# Patient Record
Sex: MALE | Race: WHITE | URBAN - METROPOLITAN AREA
[De-identification: names, ages, dates, MRNs, and addresses within clinical notes are randomized per-mention and may not be internally consistent; named-entity substitution may affect disease eponyms.]

---

## 2017-05-12 ENCOUNTER — OFFICE VISIT (OUTPATIENT)
Dept: CARDIOLOGY CLINIC | Age: 32
End: 2017-05-12

## 2017-05-12 VITALS
WEIGHT: 186 LBS | DIASTOLIC BLOOD PRESSURE: 80 MMHG | HEIGHT: 71 IN | SYSTOLIC BLOOD PRESSURE: 126 MMHG | HEART RATE: 58 BPM | RESPIRATION RATE: 18 BRPM | OXYGEN SATURATION: 99 % | BODY MASS INDEX: 26.04 KG/M2

## 2017-05-12 DIAGNOSIS — R00.2 PALPITATIONS: Primary | ICD-10-CM

## 2017-05-12 RX ORDER — ZINC GLUCONATE 10 MG
250 LOZENGE ORAL
COMMUNITY

## 2017-05-12 NOTE — MR AVS SNAPSHOT
Visit Information Date & Time Provider Department Dept. Phone Encounter #  
 5/12/2017  3:20 PM Luzmaria Valadez MD [facility] [phone] [id number] Upcoming Health Maintenance Date Due DTaP/Tdap/Td series (1 - Tdap) 5/25/2007 INFLUENZA AGE 9 TO ADULT 8/1/2017 Allergies as of 5/12/2017  Review Complete On: 5/12/2017 By: Mike Mancilla LPN No Known Allergies Current Immunizations  Never Reviewed No immunizations on file. Not reviewed this visit You Were Diagnosed With   
  
 Codes Comments Palpitations    -  Primary ICD-10-CM: R00.2 ICD-9-CM: 785.1 Vitals BP Pulse Resp Height(growth percentile) Weight(growth percentile) SpO2  
 126/80 (!) 58 18 5' 11\" (1.803 m) 186 lb (84.4 kg) 99% BMI Smoking Status 25.94 kg/m2 Smoker, Current Status Unknown Vitals History BMI and BSA Data Body Mass Index Body Surface Area  
 25.94 kg/m 2 2.06 m 2 Preferred Pharmacy Pharmacy Name Phone [facility], [address] [facility] [phone] Your Updated Medication List  
  
   
This list is accurate as of: 5/12/17  4:08 PM.  Always use your most recent med list.  
  
  
  
  
 dilTIAZem 120 mg tablet Commonly known as:  CARDIZEM Take 1 Tab by mouth daily. FLUoxetine 10 mg capsule Commonly known as:  PROzac Take 20 mg by mouth daily. magnesium 250 mg Tab Take 250 mg by mouth.  
  
 metoprolol tartrate 25 mg tablet Commonly known as:  LOPRESSOR Take 25 mg by mouth daily. We Performed the Following AMB POC EKG ROUTINE W/ 12 LEADS, INTER & REP [78834 CPT(R)] Introducing [facility] & HEALTH SERVICES! Bello Sena introduces [employer] patient portal. Now you can access parts of your medical record, email your doctor's office, and request medication refills online.    
 
1. In your internet browser, go to [email or web address] 2. Click on the First Time User? Click Here link in the Sign In box. You will see the New Member Sign Up page. 3. Enter your [employer] Access Code exactly as it appears below. You will not need to use this code after youve completed the sign-up process. If you do not sign up before the expiration date, you must request a new code. · [employer] Access Code: [email or web address] Expires: 8/10/2017  3:14 PM 
 
4. Enter the last four digits of your Social Security Number (xxxx) and Date of Birth (mm/dd/yyyy) as indicated and click Submit. You will be taken to the next sign-up page. 5. Create a [employer] ID. This will be your [employer] login ID and cannot be changed, so think of one that is secure and easy to remember. 6. Create a [employer] password. You can change your password at any time. 7. Enter your Password Reset Question and Answer. This can be used at a later time if you forget your password. 8. Enter your e-mail address. You will receive e-mail notification when new information is available in [address] 9. Click Sign Up. You can now view and download portions of your medical record. 10. Click the Download Summary menu link to download a portable copy of your medical information. If you have questions, please visit the Frequently Asked Questions section of the [employer] website. Remember, [employer] is NOT to be used for urgent needs. For medical emergencies, dial 911. Now available from your iPhone and Android! Please provide this summary of care documentation to your next provider. Your primary care clinician is listed as Ildefonso Ziegler. If you have any questions after today's visit, please call [phone].

## 2017-05-31 NOTE — PROGRESS NOTES
PATIENT NAME: Berry Mishra         32 y.o.      1985              DATE:5/12/2017    REASON FOR VISIT: Wide complex tachycardia    HISTORY OF PRESENT ILLNESS: She had a wide complex tachycardia. Was seen by electrophysiology. Cardiac MRI was performed. This demonstrated a structurally normal heart. It was felt that he was at a very low risk for sudden cardiac death. Ambulatory monitoring showed infrequent PVCs. The patient has been free of palpitation and syncope. Denies chest pain and shortness of breath. Denies edema in the lower extremities. PAST MEDICAL HISTORY:   No past medical history on file. PAST SURGICAL HISTORY:   No past surgical history on file. SOCIAL HISTORY:  Social History    Marital status: SINGLE              Spouse name:                       Years of education:                 Number of children:               Social History Main Topics    Smoking status: Smoker, Current Status Unknown                                               Packs/day: 0.00      Years: 0.00        Alcohol use: Yes               ALLERGIES:   No Known Allergies     CURRENT MEDICATIONS:   Current Outpatient Prescriptions:  magnesium 250 mg tab, Take 250 mg by mouth. FLUoxetine (PROZAC) 10 mg capsule, Take 20 mg by mouth daily. diltiazem hcl (CARDIZEM) 120 mg tablet, Take 1 Tab by mouth daily. metoprolol tartrate (LOPRESSOR) 25 mg tablet, Take 25 mg by mouth daily. No current facility-administered medications for this visit.        REVIEW of SYSTEMS:History obtained from the patient  Cardiovascular ROS: Please see history of present illness     PHYSICAL EXAMINATION:   /80  Pulse (!) 58  Resp 18  Ht 5' 11\" (1.803 m)  Wt 84.4 kg (186 lb)  SpO2 99%  BMI 25.94 kg/m2  BP Readings from Last 3 Encounters:  05/12/17 : 126/80  11/10/16 : 116/62  09/30/16 : 130/76    Pulse Readings from Last 3 Encounters:  05/12/17 : (!) 58  11/10/16 : 73  09/30/16 : 71    Wt Readings from Last 3 Encounters:  05/12/17 : 84.4 kg (186 lb)  11/10/16 : 83.5 kg (184 lb)  09/30/16 : 84.8 kg (187 lb)    General: Well-developed white male in no apparent distress. HEENT: Sclera clear. Mucous membranes pink and moist.  Neck: No jugular venous distention. Carotid upstrokes 2+ without bruits. Chest: Clear to  auscultation. Heart: PMI not palpable. Regular rhythm. No murmur or gallop. Abdomen: Nontender without masses or organomegaly. Extremities: No edema. .  Skin: Warm and dry. No stasis changes. Neuro: Alert, oriented, speech WNL, no facial asymmetry. Gait WNL. EKG: Within normal limits    IMPRESSION:  History of wide-complex tachycardia, asymptomatic  Structurally normal heart    PLAN:  No change in medications. Return to office in 6 months      The diagnoses and plan were discussed with patient. All questions answered. Plan of care agreed to by all concerned. Mandie Alanis.  MD Catrina       ,

## 2017-11-30 ENCOUNTER — OFFICE VISIT (OUTPATIENT)
Dept: CARDIOLOGY CLINIC | Age: 32
End: 2017-11-30

## 2017-11-30 VITALS
BODY MASS INDEX: 26.04 KG/M2 | SYSTOLIC BLOOD PRESSURE: 138 MMHG | HEART RATE: 66 BPM | HEIGHT: 71 IN | WEIGHT: 186 LBS | DIASTOLIC BLOOD PRESSURE: 86 MMHG | OXYGEN SATURATION: 98 %

## 2017-11-30 DIAGNOSIS — I47.20 VENTRICULAR TACHYCARDIA: ICD-10-CM

## 2017-11-30 DIAGNOSIS — F41.9 ANXIETY: ICD-10-CM

## 2017-11-30 DIAGNOSIS — R00.2 PALPITATION: Primary | ICD-10-CM

## 2017-11-30 NOTE — PROGRESS NOTES
1. Have you been to the ER, urgent care clinic since your last visit? Hospitalized since your last visit?no    2. Have you seen or consulted any other health care providers outside of the [address] [place] [address] since your last visit? Include any pap smears or colon screening.  no

## 2017-11-30 NOTE — MR AVS SNAPSHOT
Visit Information Date & Time Provider Department Dept. Phone Encounter #  
 11/30/2017  3:20 PM Sofia Faulkner MD [facility] [employer] [phone] Upcoming Health Maintenance Date Due Pneumococcal 19-64 Medium Risk (1 of 1 - PPSV23) 5/25/2005 DTaP/Tdap/Td series (1 - Tdap) 5/25/2007 Influenza Age 5 to Adult 8/1/2017 Allergies as of 11/30/2017  Review Complete On: 5/12/2017 By: Dulce Maria Johnston LPN No Known Allergies Current Immunizations  Never Reviewed No immunizations on file. Not reviewed this visit You Were Diagnosed With   
  
 Codes Comments Palpitation    -  Primary ICD-10-CM: R00.2 ICD-9-CM: 785.1 Vitals BP Pulse Height(growth percentile) Weight(growth percentile) SpO2 BMI  
 138/86 66 5' 11\" (1.803 m) 186 lb (84.4 kg) 98% 25.94 kg/m2 Smoking Status Smoker, Current Status Unknown Vitals History BMI and BSA Data Body Mass Index Body Surface Area  
 25.94 kg/m 2 2.06 m 2 Preferred Pharmacy Pharmacy Name Phone [facility], [phone] [facility] [phone] Your Updated Medication List  
  
   
This list is accurate as of: 11/30/17  4:09 PM.  Always use your most recent med list.  
  
  
  
  
 dilTIAZem 120 mg tablet Commonly known as:  CARDIZEM Take 1 Tab by mouth daily. FLUoxetine 10 mg capsule Commonly known as:  PROzac Take 20 mg by mouth daily. magnesium 250 mg Tab Take 250 mg by mouth.  
  
 metoprolol tartrate 25 mg tablet Commonly known as:  LOPRESSOR Take 25 mg by mouth daily. We Performed the Following AMB POC EKG ROUTINE W/ 12 LEADS, INTER & REP [07782 CPT(R)] Introducing [facility] & HEALTH SERVICES!    
 Polly Lizama introduces [employer] patient portal. Now you can access parts of your medical record, email your doctor's office, and request medication refills online. 1. In your internet browser, go to [email or web address] 2. Click on the First Time User? Click Here link in the Sign In box. You will see the New Member Sign Up page. 3. Enter your [facility] Access Code exactly as it appears below. You will not need to use this code after youve completed the sign-up process. If you do not sign up before the expiration date, you must request a new code. · [facility] Access Code: [email or web address] Expires: 1/29/2018 10:48 AM 
 
4. Enter the last four digits of your Social Security Number (xxxx) and Date of Birth (mm/dd/yyyy) as indicated and click Submit. You will be taken to the next sign-up page. 5. Create a [facility] ID. This will be your [facility] login ID and cannot be changed, so think of one that is secure and easy to remember. 6. Create a [facility] password. You can change your password at any time. 7. Enter your Password Reset Question and Answer. This can be used at a later time if you forget your password. 8. Enter your e-mail address. You will receive e-mail notification when new information is available in [address] 9. Click Sign Up. You can now view and download portions of your medical record. 10. Click the Download Summary menu link to download a portable copy of your medical information. If you have questions, please visit the Frequently Asked Questions section of the [facility] website. Remember, [facility] is NOT to be used for urgent needs. For medical emergencies, dial 911. Now available from your iPhone and Android! Please provide this summary of care documentation to your next provider. Your primary care clinician is listed as Bhavana Dasilva. If you have any questions after today's visit, please call [phone].

## 2017-12-09 PROBLEM — I47.20 VENTRICULAR TACHYCARDIA: Status: ACTIVE | Noted: 2017-12-09

## 2017-12-09 PROBLEM — F41.9 ANXIETY: Status: ACTIVE | Noted: 2017-12-09

## 2017-12-09 NOTE — PROGRESS NOTES
PATIENT NAME: Regan Durant         32 y.o.      1985              DATE:11/30/2017    REASON FOR VISIT: Ventricular tachycardia    HISTORY OF PRESENT ILLNESS: Occasional palpitation. Correlates these with periods of anxiety. Denies syncope and presyncope. Denies chest pain and shortness of breath. Denies edema in the lower extremities. PAST MEDICAL HISTORY:   Past Medical History:  12/9/2017: Anxiety  12/9/2017: Ventricular tachycardia ([address]    PAST SURGICAL HISTORY:   No past surgical history on file. SOCIAL HISTORY:  Social History    Marital status: SINGLE              Spouse name:                       Years of education:                 Number of children:               Social History Main Topics    Smoking status: Smoker, Current Status Unknown                                               Packs/day: 0.00      Years: 0.00        Alcohol use: Yes               ALLERGIES:   No Known Allergies     CURRENT MEDICATIONS:   Current Outpatient Prescriptions:  magnesium 250 mg tab, Take 250 mg by mouth. diltiazem hcl (CARDIZEM) 120 mg tablet, Take 1 Tab by mouth daily. FLUoxetine (PROZAC) 10 mg capsule, Take 20 mg by mouth daily. metoprolol tartrate (LOPRESSOR) 25 mg tablet, Take 25 mg by mouth daily. No current facility-administered medications for this visit.        REVIEW of SYSTEMS:History obtained from the patient  General ROS: negative for - weight gain or weight loss  Respiratory ROS: no cough, shortness of breath, or wheezing  Cardiovascular ROS: See history of present illness     PHYSICAL EXAMINATION:   /86  Pulse 66  Ht 5' 11\" (1.803 m)  Wt 84.4 kg (186 lb)  SpO2 98%  BMI 25.94 kg/m2  BP Readings from Last 3 Encounters:  11/30/17 : 138/86  05/12/17 : 126/80  11/10/16 : 116/62    Pulse Readings from Last 3 Encounters:  11/30/17 : 66  05/12/17 : (!) 58  11/10/16 : 73    Wt Readings from Last 3 Encounters:  11/30/17 : 84.4 kg (186 lb)  05/12/17 : 84.4 kg (186 lb)  11/10/16 : 83.5 kg (184 lb)    General: Well-developed white male in no apparent distress. Normal exam    EKG: Within normal limits    IMPRESSION:   Asymptomatic nonsustained ventricular tachycardia  Anxiety    PLAN:  No change in medications  Return to office in 1 year    The diagnoses and plan were discussed with patient. All questions answered. Plan of care agreed to by all concerned. Xiomara Fritz MD       ,

## 2018-06-28 ENCOUNTER — OFFICE VISIT (OUTPATIENT)
Dept: CARDIOLOGY CLINIC | Age: 33
End: 2018-06-28

## 2018-06-28 VITALS
HEART RATE: 67 BPM | SYSTOLIC BLOOD PRESSURE: 138 MMHG | WEIGHT: 186 LBS | DIASTOLIC BLOOD PRESSURE: 80 MMHG | OXYGEN SATURATION: 98 % | BODY MASS INDEX: 26.04 KG/M2 | HEIGHT: 71 IN

## 2018-06-28 DIAGNOSIS — I47.20 VENTRICULAR TACHYCARDIA: ICD-10-CM

## 2018-06-28 DIAGNOSIS — I49.3 PVC (PREMATURE VENTRICULAR CONTRACTION): Primary | ICD-10-CM

## 2018-06-28 DIAGNOSIS — R00.2 PALPITATION: ICD-10-CM

## 2018-06-28 DIAGNOSIS — F41.9 ANXIETY: ICD-10-CM

## 2018-06-28 RX ORDER — FLUOXETINE HYDROCHLORIDE 20 MG/1
CAPSULE ORAL DAILY
COMMUNITY

## 2018-06-28 NOTE — PROGRESS NOTES
History of Present Illness:  A 33 y.o.male here for follow up historically followed by Dr. James Samano and here for follow up. Overall he has been doing quite well since he last saw Dr. James Samano. I last saw him in November 2016. He was referred for nonsustained VT with exercise which appeared to be of RV outflow tract origin. He had a cardiac MRI that was unremarkable. He previously was treated with beta-blocker but had some difficulty with significant fatigue. I gave him Diltiazem to be used as needed. He does have an element of anxiety as well and would like to not be on long term medications. He had a 30-day event monitor at that time, very rare PVC's. We had discussed possible EP study with ablation, however, they are very rare. He is now taking Prozac 20 mg a day. Overall, he is doing quite well. He has been coming down from the [facility] and ran into traffic today and his blood pressure is high. Historically, it has been very well controlled and he sees his primary care physician.      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 He exercised 12 minutes at that time. He had a follow up 30-day event monitor with rare PVCs. Cardiac MRI 2016 with structurally normal heart. Anxiety on Prozac. He has had minimal to no symptoms. Historically, he was intolerant to beta-blocker due to severe fatigue. He did take Diltiazem for a while and seemed to do well with this. At this time, he is just using it as a prn basis as he has minimal symptoms. He denies history of syncope and had a cardiac MRI that was normal. The plan is to continue close monitoring at this time and annual follow up. He is contemplating moving from the [facility] down to the [address] [place] [address] and if he does he will follow up with me in a year. Otherwise, I stated he could follow up with Dr. Afua Moore in a year with an EKG and review of symptoms. All questions answered.         Past Medical History:   Diagnosis Date    Anxiety 12/9/2017    Ventricular tachycardia ([facility] [address] [facility] 12/9/2017       Current Outpatient Prescriptions   Medication Sig Dispense Refill    FLUoxetine (PROZAC) 20 mg capsule Take  by mouth daily.  magnesium 250 mg tab Take 250 mg by mouth.  diltiazem hcl (CARDIZEM) 120 mg tablet Take 1 Tab by mouth daily. 30 Tab 6       Social History   reports that he has been smoking. He does not have any smokeless tobacco history on file. reports that he drinks alcohol. Family History  family history is not on file. Review of Systems  Except as stated above include:  Constitutional: Negative for fever, chills and malaise/fatigue. HEENT: No congestion or recent URI. Gastrointestinal: No nausea, vomiting, abdominal pain, bloody stools. Pulmonary:  Negative except as stated above. Cardiac:  Negative except as stated above. Musculoskeletal: Negative except as stated above. Neurological:  No localized symptoms. Skin:  Negative except as stated above. Psych:  Negative except as stated above. Endocrine:  Negative except as stated above. PHYSICAL EXAM  BP Readings from Last 3 Encounters:   06/28/18 138/80   11/30/17 138/86   05/12/17 126/80     Pulse Readings from Last 3 Encounters:   06/28/18 67   11/30/17 66   05/12/17 (!) 58     Wt Readings from Last 3 Encounters:   06/28/18 84.4 kg (186 lb)   11/30/17 84.4 kg (186 lb)   05/12/17 84.4 kg (186 lb)     General:   Well developed, well groomed. Head/Neck:   No jugular venous distention     No carotid bruits. No evidence of xanthelasma. Lungs:   No respiratory distress. Clear bilaterally. Heart:    Regular rate and rhythm. Normal S1/S2. Palpation of heart with normal point of maximum impulse. No significant murmurs, rubs or gallops. Abdomen:   Soft and nontender. No palpable abdominal mass or bruits. Extremities:   Intact peripheral pulses. No significant edema.     Neurological:   Alert and oriented to person, place, time. No focal neurological deficit visually.   Skin:   No obvious rash    Blood Pressure Metric:  Narcisa Kilgore has been given the following recommendations today due to his elevated BP reading: monitor with primary MD

## 2018-06-28 NOTE — MR AVS SNAPSHOT
[address] [facility] [address] [address] [address] [phone] Patient: Madina Lima MRN: [id number] Visit Information Date & Time Provider Department Dept. Phone Encounter #  
 2018  4:20 PM Nikita Nuñez MD [facility] [address] [id number] Upcoming Health Maintenance Date Due Pneumococcal 19-64 Medium Risk (1 of 1 - PPSV23) 2005 DTaP/Tdap/Td series (1 - Tdap) 2007 Influenza Age 5 to Adult 2018 Allergies as of 2018  Review Complete On: 2018 By: Nikita Nuñez MD  
 No Known Allergies Current Immunizations  Never Reviewed No immunizations on file. Not reviewed this visit You Were Diagnosed With   
  
 Codes Comments PVC (premature ventricular contraction)    -  Primary ICD-10-CM: I49.3 ICD-9-CM: 427.69 Ventricular tachycardia (HCC)     ICD-10-CM: I47.2 ICD-9-CM: 427.1 Anxiety     ICD-10-CM: F41.9 ICD-9-CM: 300.00 Palpitation     ICD-10-CM: R00.2 ICD-9-CM: 785.1 Vitals BP Pulse Height(growth percentile) Weight(growth percentile) SpO2 BMI  
 138/80 67 5' 11\" (1.803 m) 186 lb (84.4 kg) 98% 25.94 kg/m2 Smoking Status Smoker, Current Status Unknown Vitals History BMI and BSA Data Body Mass Index Body Surface Area  
 25.94 kg/m 2 2.06 m 2 Preferred Pharmacy Pharmacy Name Phone [facility]arly [facility], [phone] [facility] [phone] Your Updated Medication List  
  
   
This list is accurate as of 18  4:52 PM.  Always use your most recent med list.  
  
  
  
  
 dilTIAZem 120 mg tablet Commonly known as:  CARDIZEM Take 1 Tab by mouth daily. magnesium 250 mg Tab Take 250 mg by mouth. PROzac 20 mg capsule Generic drug:  FLUoxetine Take  by mouth daily. We Performed the Following AMB POC EKG ROUTINE W/ 12 LEADS, INTER & REP [29501 CPT(R)] Introducing \[facility]\"" & HEALTH SERVICES! Claudette Whitney introduces [facility] patient portal. Now you can access parts of your medical record, email your doctor's office, and request medication refills online. 1. In your internet browser, go to [email or web address] 2. Click on the First Time User? Click Here link in the Sign In box. You will see the New Member Sign Up page. 3. Enter your [facility] Access Code exactly as it appears below. You will not need to use this code after youve completed the sign-up process. If you do not sign up before the expiration date, you must request a new code. · [facility] Access Code: [email or web address] Expires: 9/26/2018  4:52 PM 
 
4. Enter the last four digits of your Social Security Number (xxxx) and Date of Birth (mm/dd/yyyy) as indicated and click Submit. You will be taken to the next sign-up page. 5. Create a [facility] ID. This will be your [facility] login ID and cannot be changed, so think of one that is secure and easy to remember. 6. Create a [facility] password. You can change your password at any time. 7. Enter your Password Reset Question and Answer. This can be used at a later time if you forget your password. 8. Enter your e-mail address. You will receive e-mail notification when new information is available in [address] 9. Click Sign Up. You can now view and download portions of your medical record. 10. Click the Download Summary menu link to download a portable copy of your medical information. If you have questions, please visit the Frequently Asked Questions section of the [facility] website. Remember, [facility] is NOT to be used for urgent needs. For medical emergencies, dial 911. Now available from your iPhone and Android! Please provide this summary of care documentation to your next provider. Your primary care clinician is listed as Juani Sears.  If you have any questions after today's visit, please call [phone].

## 2018-09-07 RX ORDER — DILTIAZEM HYDROCHLORIDE 120 MG/1
120 TABLET, FILM COATED ORAL DAILY
Qty: 30 TAB | Refills: 6 | Status: SHIPPED | OUTPATIENT
Start: 2018-09-07